# Patient Record
Sex: MALE | Race: BLACK OR AFRICAN AMERICAN
[De-identification: names, ages, dates, MRNs, and addresses within clinical notes are randomized per-mention and may not be internally consistent; named-entity substitution may affect disease eponyms.]

---

## 2019-01-07 NOTE — EKG REPORT
SEVERITY:- BORDERLINE ECG -

SINUS RHYTHM

NONSPECIFIC ST-T CHANGES  LATERAL LEADS

:

Confirmed by: Yordy Matt MD 07-Jan-2019 13:06:48

## 2019-06-21 ENCOUNTER — HOSPITAL ENCOUNTER (EMERGENCY)
Dept: HOSPITAL 62 - ER | Age: 74
Discharge: HOME | End: 2019-06-21
Payer: MEDICARE

## 2019-06-21 VITALS — DIASTOLIC BLOOD PRESSURE: 87 MMHG | SYSTOLIC BLOOD PRESSURE: 172 MMHG

## 2019-06-21 DIAGNOSIS — E11.9: ICD-10-CM

## 2019-06-21 DIAGNOSIS — K14.9: Primary | ICD-10-CM

## 2019-06-21 DIAGNOSIS — K08.89: ICD-10-CM

## 2019-06-21 PROCEDURE — 99282 EMERGENCY DEPT VISIT SF MDM: CPT

## 2019-06-21 NOTE — ER DOCUMENT REPORT
HPI





- HPI


Time Seen by Provider: 06/21/19 10:14


Pain Level: Denies


Context: 





Patient is a 74-year-old male presents to the emergency department with a chief 

complaint of a bump on his right tongue.  States it started out as the size of a

pimple about 2 weeks ago.  He states that the area did pop and had a foul taste 

in his mouth but since then and is significantly grown and has become painful.  

Patient states that it is irregular and he is unable to put his dentures in 

place due to the growth and pain.  He denies difficulty swallowing or throat 

tightness.  Patient denies fever.  Patient is a diabetic in which he states he 

takes oral medications for his diabetes.  Patient denies any other complaints.  

States that he does not smoke or chew tobacco.





Past Medical History





- General


Information source: Patient





- Social History


Smoking Status: Never Smoker


Chew tobacco use (# tins/day): No


Frequency of alcohol use: None


Drug Abuse: None


Lives with: Alone


Family History: Reviewed & Not Pertinent


Patient has suicidal ideation: No


Patient has homicidal ideation: No





- Past Medical History


Cardiac Medical History: Reports: None


   Denies: Hx Heart Attack, Hx Hypertension


Pulmonary Medical History: Reports: None


   Denies: Hx Asthma


EENT Medical History: Reports: None


Neurological Medical History: Reports: None.  Denies: Hx Cerebrovascular 

Accident, Hx Seizures


Endocrine Medical History: Reports: Hx Diabetes Mellitus Type 2


Renal/ Medical History: Reports: None.  Denies: Hx Peritoneal Dialysis


Malignancy Medical History: Reports None


GI Medical History: Reports: None.  Denies: Hx Hepatitis, Hx Hiatal Hernia, Hx 

Ulcer


Musculoskeletal Medical History: Reports None


Skin Medical History: Reports None


Psychiatric Medical History: Reports: None


Traumatic Medical History: Reports: None


Infectious Medical History: Reports: None.  Denies: Hx Hepatitis


Past Surgical History: Reports: None.  Denies: Hx Open Heart Surgery, Hx 

Pacemaker





Vertical Provider Document





- CONSTITUTIONAL


Agree With Documented VS: Yes


Exam Limitations: No Limitations


General Appearance: No Apparent Distress





- INFECTION CONTROL


TRAVEL OUTSIDE OF THE [place] IN LAST 30 DAYS: No





- HEENT


HEENT: Atraumatic, Normocephalic, PERRLA


Notes: 





Irregular appearing discolored growth on the right lateral tongue, slightly 

smaller than a marble.  No drainage is noted.  No swelling to the tongue.  

Airway is patent, no other lesion noted in the mouth.





- NECK


Neck: Normal Inspection





- RESPIRATORY


Respiratory: Breath Sounds Normal, No Respiratory Distress





- CARDIOVASCULAR


Cardiovascular: Regular Rate, Regular Rhythm





- GI/ABDOMEN


Gastrointestinal: Abdomen Soft, Abdomen Non-Tender





- NEURO


Level of Consciousness: Awake, Alert, Appropriate





- DERM


Integumentary: Warm, Dry, No Rash





Course





- Re-evaluation


Re-evalutation: 





06/21/19 10:18


Further investigation the bump on the right lateral tongue is suspicious for 

malignancy as it is irregular in appearance.  It does not appear to be an 

abscess.  I did explain to the patient the importance of following up with oral 

surgery or an ENT physician.  I will give him multiple referrals to call for an 

appointment.  I did explain to the patient that this will need to be removed or 

biopsied to rule out malignancy.  I did explain to the patient to return to the 

emergency department if the growth started to compromise his airway or patient 

started to have swelling to his tongue.





- Vital Signs


Vital signs: 


                                        











Temp Pulse Resp BP Pulse Ox


 


 99.2 F   77   16   172/87 H  98 


 


 06/21/19 09:52  06/21/19 09:52  06/21/19 09:52  06/21/19 09:52  06/21/19 09:52














Discharge





- Discharge


Clinical Impression: 


 Tongue mass





Condition: Stable


Disposition: HOME, SELF-CARE


Additional Instructions: 


Today you were seen in the emergency department for a bump on the tongue.  The 

mass is suspicious for possible malignancy - such as cancer.  I did refer you to

our local ENT Dr. Peter.  Please call his office today to make an appointment.  

The mass may need to be removed or at least biopsied.  Please return to the 

emergency department if you develop any tongue swelling or difficulty 

swallowing.


Referrals: 


SHIRLEY MULLINS FNP-C [Primary Care Provider] - Follow up as needed


ESTHER PETER DO [ASSOCIATE] - Follow up as needed

## 2019-08-12 ENCOUNTER — HOSPITAL ENCOUNTER (OUTPATIENT)
Dept: HOSPITAL 62 - RAD | Age: 74
End: 2019-08-12
Attending: OTOLARYNGOLOGY
Payer: MEDICARE

## 2019-08-12 DIAGNOSIS — E07.89: Primary | ICD-10-CM

## 2019-08-12 PROCEDURE — 76536 US EXAM OF HEAD AND NECK: CPT

## 2019-08-12 NOTE — RADIOLOGY REPORT (SQ)
EXAM DESCRIPTION:  U/S THYROID/SFT TISS HD   NECK



COMPLETED DATE/TIME:  8/12/2019 11:01 am



REASON FOR STUDY:  E07.89 OTHER SPECIFIED DISORDERS OF THYROID E07.89  OTHER SPECIFIED DISORDERS OF T
HYROID



COMPARISON:  None.



TECHNIQUE:  Dynamic and static gray-scale images acquired of the thyroid gland. Selected additional c
olor/power Doppler images recorded.  All images stored to PACS.



LIMITATIONS:  None.



FINDINGS:  RIGHT LOBE: Prominent, 5.3 x 2.4 x 2 cm.  Homogeneous echotexture.  No cystic or solid mas
ses.

LEFT LOBE: Prominent, 5.2 x 2.1 x 1.8 cm.  Homogeneous echotexture.  No cystic or solid masses.

ISTHMUS: 4.2 mm.  Homogeneous echotexture.  No cystic or solid masses.

OTHER: No other significant finding.



IMPRESSION:  There is mild prominence of the thyroid.  No thyroid masses are seen.



TECHNICAL DOCUMENTATION:  JOB ID:  [id number]

 2011 [employer]- All Rights Reserved



Reading location - IP/workstation name: [email or web address]

## 2020-03-06 ENCOUNTER — HOSPITAL ENCOUNTER (OUTPATIENT)
Dept: HOSPITAL 62 - END | Age: 75
Discharge: HOME | End: 2020-03-06
Attending: INTERNAL MEDICINE
Payer: MEDICARE

## 2020-03-06 VITALS — SYSTOLIC BLOOD PRESSURE: 160 MMHG | DIASTOLIC BLOOD PRESSURE: 91 MMHG

## 2020-03-06 DIAGNOSIS — D12.3: ICD-10-CM

## 2020-03-06 DIAGNOSIS — Z79.84: ICD-10-CM

## 2020-03-06 DIAGNOSIS — K57.30: ICD-10-CM

## 2020-03-06 DIAGNOSIS — Z79.899: ICD-10-CM

## 2020-03-06 DIAGNOSIS — Z79.82: ICD-10-CM

## 2020-03-06 DIAGNOSIS — E11.9: ICD-10-CM

## 2020-03-06 DIAGNOSIS — Z12.11: Primary | ICD-10-CM

## 2020-03-06 DIAGNOSIS — K64.8: ICD-10-CM

## 2020-03-06 DIAGNOSIS — Z86.010: ICD-10-CM

## 2020-03-06 DIAGNOSIS — I10: ICD-10-CM

## 2020-03-06 DIAGNOSIS — Z79.4: ICD-10-CM

## 2020-03-06 PROCEDURE — 00811 ANES LWR INTST NDSC NOS: CPT

## 2020-03-06 PROCEDURE — 82962 GLUCOSE BLOOD TEST: CPT

## 2020-03-06 PROCEDURE — 45385 COLONOSCOPY W/LESION REMOVAL: CPT

## 2020-03-06 NOTE — OPERATIVE REPORT
Operative Report


DATE OF SURGERY: 03/06/20


Operative Report: 





The risk, benefits and alternatives of the procedure including the risks of 

bleeding, perforation requiring surgery have been explained to the patient in 

detail and informed consent has been obtained.  Patient is placed in a left, 

lateral decubital position.  Timeout was called.  Propofol medication is 

administered.  Rectal examination is done which did not reveal any masses, tears

or fissures.  An Olympus videoscope was introduced into the patient's rectum.  

Scope was then carefully advanced all the way to the cecum.  Cecum was 

identified by the usual anatomical landmarks including the ileocecal valve as 

well as the appendiceal office.  Photodocumentation is obtained.  Scope was then

sequentially pulled back via the various segments of the colon including the 

ascending colon, hepatic lecture, transverse colon, splenic flexure, descending 

colon and finally into the rectosigmoid portions of the colon.  Retroflexion 

maneuvers performed.


PREOPERATIVE DIAGNOSIS: Personal history of polyp


POSTOPERATIVE DIAGNOSIS: 2 transverse colon polyps removed with a snare 

polypectomy and retrieved.  Diverticulosis without any evidence of 

diverticulitis.  Internal hemorrhoids


OPERATION: Colonoscopy with snare polypectomy


SURGEON: JESUS CHAVEZ


ANESTHESIA: LMAC


TISSUE REMOVED OR ALTERED: As noted above.


COMPLICATIONS: 





None.


ESTIMATED BLOOD LOSS: None.


INTRAOPERATIVE FINDINGS: As noted above.


PROCEDURE: 





Patient tolerated the procedure well.


No immediate postprocedure complications are noted.


Patient is discharged in good condition.


Discharge date 3/6/2020.


Discharge diet: Regular.


Discharge activity: Regular.


2 to 3-week follow-up to discuss findings.


Patient is instructed to call the office or proceed to the emergency room should

there be any further problems or questions.


3 to 5-year surveillance colonoscopy.